# Patient Record
Sex: FEMALE | Race: WHITE | Employment: FULL TIME | URBAN - METROPOLITAN AREA
[De-identification: names, ages, dates, MRNs, and addresses within clinical notes are randomized per-mention and may not be internally consistent; named-entity substitution may affect disease eponyms.]

---

## 2019-01-28 ENCOUNTER — OFFICE VISIT (OUTPATIENT)
Dept: URGENT CARE | Facility: URGENT CARE | Age: 29
End: 2019-01-28
Payer: COMMERCIAL

## 2019-01-28 VITALS
HEART RATE: 83 BPM | TEMPERATURE: 98.3 F | WEIGHT: 249.6 LBS | DIASTOLIC BLOOD PRESSURE: 92 MMHG | SYSTOLIC BLOOD PRESSURE: 129 MMHG | OXYGEN SATURATION: 98 %

## 2019-01-28 DIAGNOSIS — M54.50 ACUTE BILATERAL LOW BACK PAIN WITHOUT SCIATICA: Primary | ICD-10-CM

## 2019-01-28 PROCEDURE — 99213 OFFICE O/P EST LOW 20 MIN: CPT | Performed by: NURSE PRACTITIONER

## 2019-01-28 RX ORDER — CELECOXIB 200 MG/1
200 CAPSULE ORAL 2 TIMES DAILY
Qty: 14 CAPSULE | Refills: 0 | Status: SHIPPED | OUTPATIENT
Start: 2019-01-28 | End: 2019-02-04

## 2019-01-28 RX ORDER — METHOCARBAMOL 750 MG/1
750 TABLET, FILM COATED ORAL 3 TIMES DAILY
Qty: 30 TABLET | Refills: 0 | Status: SHIPPED | OUTPATIENT
Start: 2019-01-28 | End: 2019-02-07

## 2019-01-28 ASSESSMENT — ENCOUNTER SYMPTOMS
GASTROINTESTINAL NEGATIVE: 1
CARDIOVASCULAR NEGATIVE: 1
CONSTITUTIONAL NEGATIVE: 1
BACK PAIN: 1
NEUROLOGICAL NEGATIVE: 1
RESPIRATORY NEGATIVE: 1

## 2019-01-28 ASSESSMENT — PAIN SCALES - GENERAL: PAINLEVEL: EXTREME PAIN (8)

## 2019-01-29 ENCOUNTER — TELEPHONE (OUTPATIENT)
Dept: URGENT CARE | Facility: URGENT CARE | Age: 29
End: 2019-01-29

## 2019-01-29 DIAGNOSIS — M54.50 ACUTE BILATERAL LOW BACK PAIN WITHOUT SCIATICA: Primary | ICD-10-CM

## 2019-01-29 RX ORDER — NAPROXEN 500 MG/1
500 TABLET ORAL 2 TIMES DAILY WITH MEALS
Qty: 30 TABLET | Refills: 0 | Status: SHIPPED | OUTPATIENT
Start: 2019-01-29 | End: 2019-02-13

## 2019-01-29 NOTE — PATIENT INSTRUCTIONS
Patient Education     Back Contusion  You have a contusion to your back. A contusion is also called a bruise. There is swelling and some bleeding under the skin. The skin may be purplish. You may have muscle aching and stiffness in the area of the bruise. There are no broken bones.  Contusions heal on their own, without further treatment. However, pain and skin discoloration may take weeks to months to go away.   Home care    Rest. Avoid heavy lifting, strenuous exertion, or any activity that causes pain.    Ice the area to reduce pain and swelling. Put ice cubes in a plastic bag or use a cold pack. (Wrap the cold source in a thin towel. Don't place it directly on your skin.) Ice the injured area for 20 minutes every 1 to 2 hours the first day. Continue with ice packs 3 to 4 times a day for the next 2 days, then as needed for the relief of pain and swelling.    Take any prescribed pain medicine. If none was prescribed, take acetaminophen, ibuprofen, or naproxen to control pain, unless you have other medical conditions that prevent taking these medicines. If you are unsure about medicines, ask your healthcare provider before you leave the hospital.  Follow-up care  Follow up with your healthcare provider, or as directed. Call if you are not better in 1 to 2 weeks.  When to seek medical advice  Call your healthcare provider for any of the following:    New or worsening pain    Increased swelling around the bruise    Pain spreads to one or both legs    Weakness or numbness in one or both legs     Loss of bowel or bladder control    Numbness in the groin or genital area    Fever of 100.4 F (38 C) or higher, or as directed by your healthcare provider  Date Last Reviewed: 7/1/2017 2000-2018 [employer]. [address] [place] [address], [place] [address]. All rights reserved. This information is not intended as a substitute for professional medical care. Always follow your healthcare professional's  instructions.           Patient Education     Relieving Back Pain  Back pain is a common problem. You can strain back muscles by lifting too much weight or just by moving the wrong way. Back strain can be uncomfortable, even painful. And it can take weeks or months to improve. To help yourself feel better and prevent future back strains, try these tips.  Important: Don't give aspirin to children or teens without first discussing it with your child's healthcare provider.   Ice    Ice reduces muscle pain and swelling. It helps most during the first 24 to 48 hours after an injury.    Wrap an ice pack or a bag of frozen peas in a thin towel. Never put ice directly on your skin.    Place the ice where your back hurts the most.    Don t ice for more than 20 minutes at a time.    You can use ice several times a day.  Medicines  Over-the-counter pain relievers include acetaminophen and anti-inflammatory medicines, which includes aspirin, naproxen, or ibuprofen. They can help ease discomfort. Some also reduce swelling.    Tell your healthcare provider about any medicines you are already taking.    Take medicines only as directed.  Manipulation and massage  Having manipulation by an osteopathic doctor or chiropractor may be helpful. Getting a massage also may help.   Heat  After the first 48 hours, heat can relax sore muscles and improve blood flow.    Try a warm bath or shower. Or use a heating pad set on low. To prevent a burn, keep a cloth between you and the heating pad.    Don t use a heating pad for more than 15 minutes at a time. Never sleep on a heating pad.  Date Last Reviewed: 6/1/2018 2000-2018 [employer]. [address] [place] [address], [place] [address]. All rights reserved. This information is not intended as a substitute for professional medical care. Always follow your healthcare professional's instructions.

## 2019-01-29 NOTE — PROGRESS NOTES
HPI  Natacha Escobar 28 year old presents with bilateral low back pain. She was shoveling last night and slipped on the ice, landing squarely on the back. Pain has been increasing all day. She has taken OTC analgesics with little improvement. No icing but did use heat. Negative for leg weakness, radiation or loss of bowel/bladder control.     Past Medical History:   Diagnosis Date     Migraine headache      PCOS (polycystic ovarian syndrome)       Past Surgical History:   Procedure Laterality Date     TONSILLECTOMY          Allergies   Allergen Reactions     Penicillins Hives     Current Outpatient Medications   Medication     celecoxib (CELEBREX) 200 MG capsule     methocarbamol (ROBAXIN) 750 MG tablet     No current facility-administered medications for this visit.          Review of Systems   Constitutional: Negative.    HENT: Negative.    Respiratory: Negative.    Cardiovascular: Negative.    Gastrointestinal: Negative.    Genitourinary: Negative.    Musculoskeletal: Positive for back pain.   Skin: Negative.         no bruising/abrasions   Neurological: Negative.    Endo/Heme/Allergies: Negative.          Physical Exam   Constitutional: She appears well-developed and well-nourished. No distress.   BP (!) 129/92 (BP Location: Left arm, Patient Position: Chair, Cuff Size: Adult Large)   Pulse 83   Temp 98.3  F (36.8  C) (Oral)   Wt 113.2 kg (249 lb 9.6 oz)   SpO2 98% .  Appears very uncomfortable. Standing in the Exam Room as it is too uncomfortable to sit.    HENT:   Head: Normocephalic.   Neck: Normal range of motion.   Cardiovascular: Normal rate.   Pulmonary/Chest: Effort normal.   Musculoskeletal:        Lumbar back: She exhibits decreased range of motion, pain and spasm.   Positive for pain throughout the low back with palpable spasms of the paraspinous muscles.   No leg weakness and negative straight leg lift. Able to stand on heel/toes and shallow knee bend.    Neurological: She has normal  strength. No sensory deficit. Gait normal.   Nursing note and vitals reviewed.    Assessment:  1. Acute bilateral low back pain without sciatica        Plan:  Orders Placed This Encounter     methocarbamol (ROBAXIN) 750 MG tablet     celecoxib (CELEBREX) 200 MG capsule   No ibuprofen or aleve with celebrex. May add tylenol   Instructions regarding self-care of patient/child reviewed.   Written instructions provided in after visit summary and reviewed.  Patient instructed to see primary care provider for new or persistent symptoms.   Red flag symptoms reviewed and patient has been instructed to seek emergent   Care at the closest emergency room for evaluation and treatment.   Please contact pharmacy for medication questions.  Patient instructed to take medications as directed on package.      Shilpa Thompson, APRN, CNP

## 2019-01-29 NOTE — TELEPHONE ENCOUNTER
Will route to provider pool to see if an alternative medication can be prescribed.  Katie Muse, CMA

## 2019-01-29 NOTE — TELEPHONE ENCOUNTER
Reason for Call:  Other prescription    Detailed comments: [facility] calling for they have questions regarding  Medication directions for celebrex for the drug requires a PA and would like a call back to discuss further.    Phone Number Pharmacy  can be reached at: Other phone number:  [phone]    Best Time: anytime    Can we leave a detailed message on this number? YES    Call taken on 1/29/2019 at 9:45 AM by Dylan Mcdonald

## 2019-06-29 ENCOUNTER — OFFICE VISIT (OUTPATIENT)
Dept: URGENT CARE | Facility: URGENT CARE | Age: 29
End: 2019-06-29
Payer: COMMERCIAL

## 2019-06-29 DIAGNOSIS — N93.9 VAGINAL BLEEDING: Primary | ICD-10-CM
